# Patient Record
Sex: FEMALE | Race: WHITE | Employment: FULL TIME | URBAN - METROPOLITAN AREA
[De-identification: names, ages, dates, MRNs, and addresses within clinical notes are randomized per-mention and may not be internally consistent; named-entity substitution may affect disease eponyms.]

---

## 2018-01-29 ENCOUNTER — OFFICE VISIT (OUTPATIENT)
Dept: OBGYN CLINIC | Facility: CLINIC | Age: 48
End: 2018-01-29

## 2018-01-29 VITALS
BODY MASS INDEX: 33.61 KG/M2 | DIASTOLIC BLOOD PRESSURE: 68 MMHG | HEIGHT: 60 IN | WEIGHT: 171.19 LBS | SYSTOLIC BLOOD PRESSURE: 102 MMHG

## 2018-01-29 DIAGNOSIS — N63.20 LEFT BREAST MASS: ICD-10-CM

## 2018-01-29 DIAGNOSIS — N93.9 ABNORMAL UTERINE BLEEDING (AUB): Primary | ICD-10-CM

## 2018-01-29 DIAGNOSIS — N63.10 BREAST MASS, RIGHT: ICD-10-CM

## 2018-01-29 DIAGNOSIS — N92.1 MENORRHAGIA WITH IRREGULAR CYCLE: ICD-10-CM

## 2018-01-29 PROCEDURE — 99204 OFFICE O/P NEW MOD 45 MIN: CPT | Performed by: OBSTETRICS & GYNECOLOGY

## 2018-01-29 RX ORDER — MISOPROSTOL 200 UG/1
200 TABLET ORAL 2 TIMES DAILY
Qty: 2 TABLET | Refills: 0 | Status: SHIPPED | OUTPATIENT
Start: 2018-01-29 | End: 2018-02-12 | Stop reason: ALTCHOICE

## 2018-01-29 NOTE — PROGRESS NOTES
[facility]  Obstetrics and Gynecology Referral  History & Physical    CC: Patient is a new patient who presents with c/o AUB and right breast mass     Subjective:     HPI: Cash Blanco is a 52year old  female who presents with c/o AUB and ONLY  2013: LAPAROSCOPIC CHOLECYSTECTOMY    Social History:    Social History  Social History   Marital status:   Spouse name: N/A    Years of education: N/A  Number of children: N/A     Occupational History  None on file     Social History Main Top deferred per patient request, pt will like to complete at next visit   - pelvic US ordered   - labs ordered   - advised EMB  - -discussion held with the patient regarding EMB procedure including risks, benefits and alternatives  - discussion included but n

## 2018-01-29 NOTE — PATIENT INSTRUCTIONS
Please make an office visit for endometrial biopsy   Please complete your recommended ultrasound imaging before your next visit     Please take Advil/Ibuprofen 600 mg by mouth at least 1-2 hours prior to your procedure     Please take the medication Cytote

## 2018-01-30 ENCOUNTER — TELEPHONE (OUTPATIENT)
Dept: OBGYN CLINIC | Facility: CLINIC | Age: 48
End: 2018-01-30

## 2018-01-30 DIAGNOSIS — N63.10 BREAST MASS, RIGHT: Primary | ICD-10-CM

## 2018-01-30 DIAGNOSIS — N63.20 LEFT BREAST MASS: ICD-10-CM

## 2018-01-30 NOTE — TELEPHONE ENCOUNTER
Central Scheduling calling and Mammo order needs to be changed    Pt is not Augmented    Please change order

## 2018-02-05 ENCOUNTER — HOSPITAL ENCOUNTER (OUTPATIENT)
Dept: ULTRASOUND IMAGING | Age: 48
Discharge: HOME | End: 2018-02-05
Attending: OBSTETRICS & GYNECOLOGY
Payer: COMMERCIAL

## 2018-02-05 ENCOUNTER — HOSPITAL ENCOUNTER (OUTPATIENT)
Dept: MAMMOGRAPHY | Age: 48
Discharge: HOME | End: 2018-02-05
Attending: OBSTETRICS & GYNECOLOGY
Payer: COMMERCIAL

## 2018-02-05 DIAGNOSIS — N63.10 BREAST MASS, RIGHT: ICD-10-CM

## 2018-02-05 DIAGNOSIS — N63.20 LEFT BREAST MASS: ICD-10-CM

## 2018-02-05 PROCEDURE — 77062 BREAST TOMOSYNTHESIS BI: CPT | Performed by: OBSTETRICS & GYNECOLOGY

## 2018-02-05 PROCEDURE — 76642 ULTRASOUND BREAST LIMITED: CPT | Performed by: OBSTETRICS & GYNECOLOGY

## 2018-02-05 PROCEDURE — 77066 DX MAMMO INCL CAD BI: CPT | Performed by: OBSTETRICS & GYNECOLOGY

## 2018-02-12 ENCOUNTER — OFFICE VISIT (OUTPATIENT)
Dept: SURGERY | Facility: CLINIC | Age: 48
End: 2018-02-12

## 2018-02-12 VITALS
DIASTOLIC BLOOD PRESSURE: 83 MMHG | RESPIRATION RATE: 20 BRPM | TEMPERATURE: 99 F | SYSTOLIC BLOOD PRESSURE: 128 MMHG | WEIGHT: 170 LBS | HEART RATE: 80 BPM | BODY MASS INDEX: 33 KG/M2

## 2018-02-12 DIAGNOSIS — N60.02 BREAST CYST, LEFT: ICD-10-CM

## 2018-02-12 DIAGNOSIS — N60.81 SEBACEOUS CYST OF SKIN OF RIGHT BREAST: Primary | ICD-10-CM

## 2018-02-12 PROCEDURE — 99244 OFF/OP CNSLTJ NEW/EST MOD 40: CPT | Performed by: SURGERY

## 2018-02-12 NOTE — PROGRESS NOTES
Results reviewed. Tests show no significant abnormalities.  Please inform patient per Radiology protocol

## 2018-02-12 NOTE — PROGRESS NOTES
Breast Surgery New Patient Consultation    This is the first visit for this 52year old woman, referred by Dr. Herbert Boyd, who presents for evaluation of breast mass.     History of Present Illness:   Ms. Rito Craig is a 52year old woman who presents with b Visit) with Sheng Yu MD.    Allergies:    Not on File    Family History:   Family History   Problem Relation Age of Onset   • Breast Cancer Maternal Aunt 46       She is not of Ashkenazi [employer] ancestry.     Social History:     Alcohol use Not on patient denies rash, itching, skin lesions, dry skin, change in skin color or change in moles. Hematologic/Lymphatic:  The patient denies easily bruising or bleeding or persistent swollen glands or lymph nodes.      Musculoskeletal:  The patient denies visible punctum near the 4 to 5 o'clock position in the lower inner breast with no surrounding cellulitis or underlying fluctuance.   The axillary tail is normal.  Left breast:   The skin, nipple, and areola appear normal. There is no skin dimpling with mov correspond with the location of this area. No intervention is presently recommended. I explained that simple cysts are a common finding in pre-menopausal women and that no specific intervention is universally warranted.  I did explain that if a cyst enlarg

## 2018-08-24 ENCOUNTER — OFFICE VISIT (OUTPATIENT)
Dept: OBGYN CLINIC | Facility: CLINIC | Age: 48
End: 2018-08-24
Payer: COMMERCIAL

## 2018-08-24 VITALS
HEART RATE: 97 BPM | HEIGHT: 60.75 IN | BODY MASS INDEX: 33.66 KG/M2 | WEIGHT: 176 LBS | DIASTOLIC BLOOD PRESSURE: 64 MMHG | SYSTOLIC BLOOD PRESSURE: 112 MMHG

## 2018-08-24 DIAGNOSIS — Z12.4 CERVICAL CANCER SCREENING: ICD-10-CM

## 2018-08-24 DIAGNOSIS — Z01.419 WELL WOMAN EXAM WITH ROUTINE GYNECOLOGICAL EXAM: Primary | ICD-10-CM

## 2018-08-24 PROBLEM — N63.20 LEFT BREAST MASS: Status: RESOLVED | Noted: 2018-01-29 | Resolved: 2018-08-24

## 2018-08-24 PROBLEM — N63.10 BREAST MASS, RIGHT: Status: RESOLVED | Noted: 2018-01-29 | Resolved: 2018-08-24

## 2018-08-24 PROCEDURE — 99396 PREV VISIT EST AGE 40-64: CPT | Performed by: OBSTETRICS & GYNECOLOGY

## 2018-08-24 PROCEDURE — 87624 HPV HI-RISK TYP POOLED RSLT: CPT | Performed by: OBSTETRICS & GYNECOLOGY

## 2018-08-24 RX ORDER — BUPROPION HYDROCHLORIDE 300 MG/1
300 TABLET ORAL
COMMUNITY
Start: 2017-05-25 | End: 2019-09-16 | Stop reason: ALTCHOICE

## 2018-08-24 NOTE — PROGRESS NOTES
[facility]  Obstetrics and Gynecology  History & Physical    CC: Patient is here for annual well woman exam     Subjective:     HPI: Xenia Alvarez is a 50year old  female here for annual well women exam.  Patient reports she been following [facility]     Other Topics Concern    Caffeine Concern Yes    Exercise Yes    Comment: walking    Seat Belt Yes     Social History Narrative   None on file         Patient feels unsafe or threatened?: denies    Abuse: denies physical, sexual or mental.     Fa smear today  Health maintenance  - encouraged to maintain weight at healthy BMI  - discussed importance of exercise and healthy eating  - repeat mammogram 02/2019  AUB  - advised to complete ordered lab  - advised to complete pelvic US  - discussion held w

## 2018-08-24 NOTE — PATIENT INSTRUCTIONS
Please make an office visit for endometrial biopsy   Please complete your recommended ultrasound imaging and lab testing before your next visit     Please take Advil/Ibuprofen 600 mg by mouth at least 1-2 hours prior to your procedure     Please take the m

## 2018-08-27 ENCOUNTER — TELEPHONE (OUTPATIENT)
Dept: OBGYN CLINIC | Facility: CLINIC | Age: 48
End: 2018-08-27

## 2018-08-27 NOTE — TELEPHONE ENCOUNTER
Pt called states she found out her ins does not cover Ed[facility] lab, she is requesting labs be reordered so she can use [facility].    Please fax lab orders to [facility] at [phone].   Ph. [phone]

## 2018-08-29 DIAGNOSIS — R79.89 ELEVATED PLATELET COUNT: Primary | ICD-10-CM

## 2018-08-29 LAB
ABSOLUTE BASOPHILS: 61 CELLS/UL (ref 0–200)
ABSOLUTE EOSINOPHILS: 85 CELLS/UL (ref 15–500)
ABSOLUTE LYMPHOCYTES: 4150 CELLS/UL (ref 850–3900)
ABSOLUTE MONOCYTES: 774 CELLS/UL (ref 200–950)
ABSOLUTE NEUTROPHILS: 7030 CELLS/UL (ref 1500–7800)
BASOPHILS: 0.5 %
EOSINOPHILS: 0.7 %
ESTRADIOL: 96 PG/ML
FSH: 3.2 MIU/ML
HCG, TOTAL, QN: <2 MIU/ML
HEMATOCRIT: 41.8 % (ref 35–45)
HEMOGLOBIN: 13.6 G/DL (ref 11.7–15.5)
HPV I/H RISK 1 DNA SPEC QL NAA+PROBE: NEGATIVE
LAST PAP RESULT: NORMAL
LYMPHOCYTES: 34.3 %
MCH: 28 PG (ref 27–33)
MCHC: 32.5 G/DL (ref 32–36)
MCV: 86 FL (ref 80–100)
MONOCYTES: 6.4 %
MPV: 10.6 FL (ref 7.5–12.5)
NEUTROPHILS: 58.1 %
PAP HISTORY (OTHER THAN LAST PAP): NORMAL
PLATELET COUNT: 422 THOUSAND/UL (ref 140–400)
PROLACTIN: 25.3 NG/ML
RDW: 12.8 % (ref 11–15)
RED BLOOD CELL COUNT: 4.86 MILLION/UL (ref 3.8–5.1)
TSH: 1.18 MIU/L
WHITE BLOOD CELL COUNT: 12.1 THOUSAND/UL (ref 3.8–10.8)

## 2018-08-29 NOTE — PROGRESS NOTES
Please inform the patient of normal lab testing for AUB. However, mild elevated platelet count which is likely to be transient. Advise to repeat in 4-6 weeks. If continues to be elevated then follow up with PCP. Please inform the patient.

## 2018-08-29 NOTE — PROGRESS NOTES
Pap smear normal and negative for HPV. Repeat pap smear in 5 years per ASCCP guidelines. Continue annual gynecologic exams. However, patient advised to follow up sooner for EMB 2/2 AUB. Please notify patient.

## 2018-08-29 NOTE — PROGRESS NOTES
Patient informed of results. Verbalized understanding. No further questions or concerns.   Order entered as Q.

## 2018-08-30 ENCOUNTER — HOSPITAL ENCOUNTER (OUTPATIENT)
Dept: ULTRASOUND IMAGING | Facility: HOSPITAL | Age: 48
Discharge: HOME | End: 2018-08-30
Attending: OBSTETRICS & GYNECOLOGY
Payer: COMMERCIAL

## 2018-08-30 DIAGNOSIS — N92.1 MENORRHAGIA WITH IRREGULAR CYCLE: ICD-10-CM

## 2018-08-30 DIAGNOSIS — N93.9 ABNORMAL UTERINE BLEEDING (AUB): ICD-10-CM

## 2018-08-30 PROCEDURE — 76830 TRANSVAGINAL US NON-OB: CPT | Performed by: OBSTETRICS & GYNECOLOGY

## 2018-08-30 PROCEDURE — 76856 US EXAM PELVIC COMPLETE: CPT | Performed by: OBSTETRICS & GYNECOLOGY

## 2018-08-31 NOTE — PROGRESS NOTES
Patient informed of results. Verbalized understanding. She is going to call back once she gets her menses so she knows when she should schedule. No further questions or concerns.

## 2018-08-31 NOTE — PROGRESS NOTES
Please inform the patient that overall pelvic US reassuring. I would recommend to continue with EMB to ensure normal tissue of endometrial lining. Lining on US wnl but AUB warrants EMB for further evaluation.

## 2018-09-17 ENCOUNTER — TELEPHONE (OUTPATIENT)
Dept: OBGYN CLINIC | Facility: CLINIC | Age: 48
End: 2018-09-17

## 2018-09-17 NOTE — TELEPHONE ENCOUNTER
Pt calling states that she is now receiving bill because they never got referral auth for her visit with Dr Zhen Padron. Pt referred by Dr Bates Last back at end of January and had appt with Dr Zhen Padron on Feb 12, 2018. Pt was with Mesha campos at that time.  She ask

## 2018-09-17 NOTE — TELEPHONE ENCOUNTER
Spoke with [facility]. They stated that the referral has to be entered by patient's PCP. I asked twice if we could enter the referral and they stated it has to come from PCP. Reference # [id number] Patient's PCP phone number [phone]. Patient informed.  V

## 2019-08-01 ENCOUNTER — TELEPHONE (OUTPATIENT)
Dept: OBGYN CLINIC | Facility: CLINIC | Age: 49
End: 2019-08-01

## 2019-08-01 RX ORDER — VALACYCLOVIR HYDROCHLORIDE 500 MG/1
TABLET, FILM COATED ORAL
Qty: 30 TABLET | Refills: 0 | Status: SHIPPED | OUTPATIENT
Start: 2019-08-01 | End: 2019-09-16 | Stop reason: ALTCHOICE

## 2019-08-01 NOTE — TELEPHONE ENCOUNTER
Last OV: 8/24/18 with Dr. Forrest Clemons for annual  Last refill date: none  Follow-up: 3 wks for EMB  Next appt.: 9/16/19 with Byron Foster for annual    Per review of chart, this medication has not been prescribed by our office before.  Will contact patient for informatio

## 2019-08-01 NOTE — TELEPHONE ENCOUNTER
I scheduled patient for an Annual; however, she has a Herpes outbreak. In the past, she has had Valtrex 500 mg twice a day. Please call patient.

## 2019-08-01 NOTE — TELEPHONE ENCOUNTER
[address] Xiao Shankar for 1 Rx for #30 tablets.  Additional refills to be discussed at her annual visit

## 2019-09-16 ENCOUNTER — OFFICE VISIT (OUTPATIENT)
Dept: OBGYN CLINIC | Facility: CLINIC | Age: 49
End: 2019-09-16
Payer: COMMERCIAL

## 2019-09-16 VITALS
HEART RATE: 78 BPM | DIASTOLIC BLOOD PRESSURE: 70 MMHG | SYSTOLIC BLOOD PRESSURE: 120 MMHG | BODY MASS INDEX: 29.65 KG/M2 | RESPIRATION RATE: 16 BRPM | HEIGHT: 60.75 IN | WEIGHT: 155 LBS

## 2019-09-16 DIAGNOSIS — Z12.39 BREAST CANCER SCREENING: ICD-10-CM

## 2019-09-16 DIAGNOSIS — Z01.419 WELL WOMAN EXAM WITH ROUTINE GYNECOLOGICAL EXAM: Primary | ICD-10-CM

## 2019-09-16 DIAGNOSIS — N92.1 MENORRHAGIA WITH IRREGULAR CYCLE: ICD-10-CM

## 2019-09-16 DIAGNOSIS — N60.81 SEBACEOUS CYST OF BREAST, RIGHT: ICD-10-CM

## 2019-09-16 PROCEDURE — 99396 PREV VISIT EST AGE 40-64: CPT | Performed by: NURSE PRACTITIONER

## 2019-09-16 RX ORDER — LAMOTRIGINE 25 MG/1
1 TABLET ORAL DAILY
COMMUNITY
Start: 2019-09-12

## 2019-09-16 RX ORDER — VALACYCLOVIR HYDROCHLORIDE 500 MG/1
500 TABLET, FILM COATED ORAL 2 TIMES DAILY
Qty: 30 TABLET | Refills: 1 | Status: SHIPPED | OUTPATIENT
Start: 2019-09-16 | End: 2019-09-19

## 2019-09-16 NOTE — PROGRESS NOTES
Here for Routine Annual Exam  Cyst on her right breast is larger and protrudes more- she would like referral to breast specialist for removal.  Menses are irregular and heavy. She will have them twice a month at times.  She also experiences night sweats

## 2019-10-14 ENCOUNTER — TELEPHONE (OUTPATIENT)
Dept: SURGERY | Facility: CLINIC | Age: 49
End: 2019-10-14

## 2019-10-14 NOTE — TELEPHONE ENCOUNTER
Pt phoned in for recent infection of her sebaceous cyst on her right breast. Started oozing last night \"burst\" and went to urgent care. She was seen at urgent care at [facility]. She is on keflex and doing warm compresses.    I let her know

## 2019-10-15 ENCOUNTER — TELEPHONE (OUTPATIENT)
Dept: SURGERY | Facility: CLINIC | Age: 49
End: 2019-10-15

## 2019-10-15 NOTE — TELEPHONE ENCOUNTER
I contacted patient to let her know Dr Brandon Astudillo would want to see her in 10-14 days, sooner if it is hurting, and stops draining. Pt reports it is still draining, but less than before. Questions addressed regarding treatment.    appt set up for 10/28, pt

## 2019-10-26 ENCOUNTER — HOSPITAL ENCOUNTER (OUTPATIENT)
Dept: MAMMOGRAPHY | Facility: HOSPITAL | Age: 49
Discharge: HOME | End: 2019-10-26
Attending: NURSE PRACTITIONER
Payer: COMMERCIAL

## 2019-10-26 DIAGNOSIS — Z12.39 BREAST CANCER SCREENING: ICD-10-CM

## 2019-10-26 PROCEDURE — 77067 SCR MAMMO BI INCL CAD: CPT | Performed by: NURSE PRACTITIONER

## 2019-10-26 PROCEDURE — 77063 BREAST TOMOSYNTHESIS BI: CPT | Performed by: NURSE PRACTITIONER

## 2019-10-27 PROBLEM — Z80.3 FAMILY HISTORY OF BREAST CANCER: Status: ACTIVE | Noted: 2019-10-27

## 2019-10-28 ENCOUNTER — TELEPHONE (OUTPATIENT)
Dept: SURGERY | Facility: CLINIC | Age: 49
End: 2019-10-28

## 2019-10-28 NOTE — TELEPHONE ENCOUNTER
LVM for patient to get an update on the sebaceous cyst we had recently talked about that was infected. She did have a mammogram over the weekend, that was normal.   Asked her to call back to discuss the timing of her follow up appt.

## 2019-11-26 ENCOUNTER — OFFICE VISIT (OUTPATIENT)
Dept: SURGERY | Facility: CLINIC | Age: 49
End: 2019-11-26
Payer: COMMERCIAL

## 2019-11-26 VITALS
TEMPERATURE: 97 F | BODY MASS INDEX: 28 KG/M2 | HEART RATE: 69 BPM | SYSTOLIC BLOOD PRESSURE: 133 MMHG | DIASTOLIC BLOOD PRESSURE: 61 MMHG | WEIGHT: 146.63 LBS | RESPIRATION RATE: 20 BRPM

## 2019-11-26 DIAGNOSIS — N60.02 BREAST CYST, LEFT: ICD-10-CM

## 2019-11-26 DIAGNOSIS — N60.81 SEBACEOUS CYST OF SKIN OF RIGHT BREAST: Primary | ICD-10-CM

## 2019-11-26 PROCEDURE — 99214 OFFICE O/P EST MOD 30 MIN: CPT | Performed by: SURGERY

## 2020-03-25 ENCOUNTER — TELEPHONE (OUTPATIENT)
Dept: OBGYN CLINIC | Facility: CLINIC | Age: 50
End: 2020-03-25

## 2020-03-25 NOTE — TELEPHONE ENCOUNTER
Pt calling and scheduled EMB with Dr. Carley Aguirre   Date Time Provider Dev Steward   4/1/2020  1:30 PM Donavan Olivier MD [facility] [address]       Pt says she needs a medication to dilate cervix before appt    Please advise Pt

## 2020-03-26 DIAGNOSIS — N93.9 ABNORMAL UTERINE BLEEDING (AUB): Primary | ICD-10-CM

## 2020-03-26 RX ORDER — MISOPROSTOL 100 UG/1
100 TABLET ORAL 2 TIMES DAILY
Qty: 2 TABLET | Refills: 0 | Status: SHIPPED | OUTPATIENT
Start: 2020-03-26 | End: 2020-04-01 | Stop reason: ALTCHOICE

## 2020-03-31 ENCOUNTER — TELEPHONE (OUTPATIENT)
Dept: OBGYN CLINIC | Facility: CLINIC | Age: 50
End: 2020-03-31

## 2020-03-31 NOTE — TELEPHONE ENCOUNTER
Patient has questions on the medication given to. Unsure how to insert. Please call. She must do this tonite.

## 2020-03-31 NOTE — TELEPHONE ENCOUNTER
Informed patient she should insert as far as she can. Instructions given for night and morning. No further questions or concerns.

## 2020-04-01 ENCOUNTER — OFFICE VISIT (OUTPATIENT)
Dept: OBGYN CLINIC | Facility: CLINIC | Age: 50
End: 2020-04-01
Payer: COMMERCIAL

## 2020-04-01 VITALS
SYSTOLIC BLOOD PRESSURE: 118 MMHG | DIASTOLIC BLOOD PRESSURE: 70 MMHG | HEIGHT: 60.75 IN | HEART RATE: 87 BPM | WEIGHT: 145 LBS | BODY MASS INDEX: 27.73 KG/M2

## 2020-04-01 DIAGNOSIS — Z01.812 PRE-PROCEDURE LAB EXAM: ICD-10-CM

## 2020-04-01 DIAGNOSIS — N92.1 MENORRHAGIA WITH IRREGULAR CYCLE: ICD-10-CM

## 2020-04-01 DIAGNOSIS — N93.9 ABNORMAL UTERINE BLEEDING (AUB): Primary | ICD-10-CM

## 2020-04-01 PROCEDURE — 81025 URINE PREGNANCY TEST: CPT | Performed by: OBSTETRICS & GYNECOLOGY

## 2020-04-01 PROCEDURE — 58100 BIOPSY OF UTERUS LINING: CPT | Performed by: OBSTETRICS & GYNECOLOGY

## 2020-04-01 PROCEDURE — 99214 OFFICE O/P EST MOD 30 MIN: CPT | Performed by: OBSTETRICS & GYNECOLOGY

## 2020-04-01 NOTE — PROGRESS NOTES
[facility]  Obstetrics and Gynecology  Follow Up Progress Note    Subjective:     Demetrice Locke is a 52year old  female who was last seen in office 2019 by [facility] but last seen by this physician in 2018 and presents with c/o AUB a/w H masses are identified. There is a small cyst arising from the left ovary possibly hemorrhagic 1.1 x 0.9 x 0.5 cm. CUL-DE-SAC:  Normal.  No fluid or mass.     OTHER:  Negative.    =====  CONCLUSION:  Nonvisualization of the right ovary likely due to bowel alternatives of IUD including but not limited to irregular bleeding, infection, injury and pregnancy. Advised patient to consider options and information provided. Advised to contact office if she desires IUD placement.  The patient also expressed interest

## 2020-04-01 NOTE — PROCEDURES
Procedure: Endometrial biopsy     Date of Procedure: 20    Pre-procedure diagnosis:  AUB    Post-procedure diagnosis:   AUB    Indications:   52year old female  who presents for endometrial biopsy  AUB    Procedure details:   The procedure,

## 2020-04-03 NOTE — PROGRESS NOTES
Breast Surgery Surveillance    History of Present Illness:   Ms. Michelle Gauthier is a 52year old woman who presented with bilateral breast concerns who has not been seen since 2018.   The patient has reported tenderness related to a palpable mass on the rig achieved menarche at age 13 and LMP  LMP: 11/05/19  She has history of oral contraceptive use for unknown  years, last unknown years ago. She denies infertility treatment to achieve pregnancy.     Medications:    No outpatient medications have been marked abdominal pain or vomiting blood.      Genitourinary:  The patient denies frequent urination, needing to get up at night to urinate, urinary hesitancy or retaining urine, painful urination, urinary incontinence, decreased urine stream, blood in the urine or clear to auscultation. Heart: The rhythm is regular. There are no murmurs, rubs, gallops or thrills. Breasts:  Her breasts are symmetrical with a cup size DD.   Right breast: The nipple ,and areola appear normal. There is no skin dimpling with moveme come back to see me if it grows and we did briefly review the process of excisional biopsy should that become necessary in the future.     I have recommended that she return to see me in 6 months for a clinical evaluation of the cyst.  She will be due for h

## 2020-04-08 NOTE — PROGRESS NOTES
Please let pt know her EMB was benign, not cancerous or precancerous. Dr. Elsie Fitzgerald is out of the office, but can make recs once she returns as far as next step pending what patients decision was--Mirena IUD vs hyst it looks like was mentioned. Thanks!

## 2020-04-15 NOTE — PROGRESS NOTES
Benign EMB  Attempted to contact patient   Left message   mychart message sent - information of my chart message left on telephone message

## 2023-02-06 ENCOUNTER — OFFICE VISIT (OUTPATIENT)
Dept: OBGYN CLINIC | Facility: CLINIC | Age: 53
End: 2023-02-06
Payer: COMMERCIAL

## 2023-02-06 VITALS
WEIGHT: 164 LBS | SYSTOLIC BLOOD PRESSURE: 118 MMHG | BODY MASS INDEX: 31 KG/M2 | DIASTOLIC BLOOD PRESSURE: 70 MMHG | HEART RATE: 103 BPM

## 2023-02-06 DIAGNOSIS — N94.10 DYSPAREUNIA, FEMALE: ICD-10-CM

## 2023-02-06 DIAGNOSIS — Z01.419 WELL WOMAN EXAM WITH ROUTINE GYNECOLOGICAL EXAM: Primary | ICD-10-CM

## 2023-02-06 DIAGNOSIS — Z12.31 ENCOUNTER FOR SCREENING MAMMOGRAM FOR MALIGNANT NEOPLASM OF BREAST: ICD-10-CM

## 2023-02-06 DIAGNOSIS — R45.86 MOOD CHANGES: ICD-10-CM

## 2023-02-06 DIAGNOSIS — R68.82 DECREASED LIBIDO: ICD-10-CM

## 2023-02-06 DIAGNOSIS — Z12.4 SCREENING FOR CERVICAL CANCER: ICD-10-CM

## 2023-02-06 NOTE — PATIENT INSTRUCTIONS
Luvena or Replens - vaginal moisturizer, apply daily or 2 times per week     Revaree/HaloGyn - twice a week vaginal moisturizer (hyaluronic acid), insert capsule 2 times per week     Uberlube - personal lubricant as needed     Mood changes:   Estroven (estrogen component)    Relizen     Decreased libido:  [employer]

## 2023-02-08 LAB — HPV MRNA E6/E7: NOT DETECTED

## 2023-03-09 ENCOUNTER — TELEPHONE (OUTPATIENT)
Dept: OBGYN CLINIC | Facility: CLINIC | Age: 53
End: 2023-03-09

## (undated) NOTE — LETTER
Kacie Bhat, :1970    CONSENT FOR PROCEDURE/SEDATION    1. I authorize the performance upon aKcie Bhat  the following: endometrial biopsy    2.  I authorize Dr. Roshni Paul MD (and whomever is designated as the doctor’s assistant), to per Witness: _________________________________________ Date:___________     Physician Signature: _______________________________ Date:___________